# Patient Record
Sex: FEMALE | Race: WHITE | URBAN - METROPOLITAN AREA
[De-identification: names, ages, dates, MRNs, and addresses within clinical notes are randomized per-mention and may not be internally consistent; named-entity substitution may affect disease eponyms.]

---

## 2017-09-13 ENCOUNTER — PRE VISIT (OUTPATIENT)
Dept: ORTHOPEDICS | Facility: CLINIC | Age: 37
End: 2017-09-13

## 2017-09-13 NOTE — TELEPHONE ENCOUNTER
1.  Date/reason for appt: 10/3/17- Rt elbow ulnar entrapment, ring and pinkie finger is numb     2.  Referring provider: Self     3.  Call to patient (Yes / No - short description): No - pt is self referring. Per appt note - no outside records. (Kami Feliz PA-C is her friend.)     4.  Previous care at / records requested from:     [address] [place] [address] Orthopedics - Dr. Lovelace (Seen for knee pain.)     Closing encounter.

## 2017-10-03 ENCOUNTER — OFFICE VISIT (OUTPATIENT)
Dept: ORTHOPEDICS | Facility: CLINIC | Age: 37
End: 2017-10-03

## 2017-10-03 VITALS — WEIGHT: 150 LBS | BODY MASS INDEX: 21.47 KG/M2 | HEIGHT: 70 IN

## 2017-10-03 DIAGNOSIS — G56.21 ENTRAPMENT OF RIGHT ULNAR NERVE AT ELBOW: Primary | ICD-10-CM

## 2017-10-03 RX ORDER — BUPROPION HYDROCHLORIDE 150 MG/1
150 TABLET ORAL
COMMUNITY

## 2017-10-03 ASSESSMENT — ENCOUNTER SYMPTOMS
STIFFNESS: 0
SPEECH CHANGE: 0
NUMBNESS: 1
MUSCLE CRAMPS: 0
MYALGIAS: 0
WEAKNESS: 0
MUSCLE WEAKNESS: 0
HEADACHES: 0
DISTURBANCES IN COORDINATION: 0
BACK PAIN: 0
ARTHRALGIAS: 1
TREMORS: 0
SEIZURES: 0
PARALYSIS: 0
TINGLING: 1
NECK PAIN: 0
DIZZINESS: 0
MEMORY LOSS: 0
JOINT SWELLING: 0
LOSS OF CONSCIOUSNESS: 0

## 2017-10-03 NOTE — PROGRESS NOTES
CHIEF COMPLAINT:  Right tingling in the ring and small finger.      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  She states that she feels this initially started in a mild fashion during her pregnancy 5 or 6 years ago and it has been intermittently bothersome.  However, over the past 3 months she feels that the tingling has become worse.  She notices this most whenever she wakes up after sleeping on a hyperflexed elbow and has significant numbness in her ring and small finger.  This can last for hours or up to 2-3 days.  She does not have symptoms every night, but it is becoming more frequent.  She notes mild aching in her elbow.  Tingling is exacerbated by elbow flexion such as holding a cell phone to text or talk on the phone.  She has not had a trial of night bracing.  She is still able to perform her job without difficulty.      PAST MEDICAL HISTORY:  Asthma.  Patient had a pulmonary embolism as an adolescent following a right meniscal repair.      MEDICATIONS:   1.  Bupropion.   2.  Advair.   3.  Albuterol.   4.  Multivitamin.      ALLERGIES:  No known drug allergies.      PAST SURGICAL HISTORY:   1.  Appendectomy.   2.  Right meniscal repair.   3.  Hysterectomy for cervical intraepithelial neoplasia.      FAMILY HISTORY:  Negative for any bleeding or clotting disorders or problems with general anesthesia.      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  She does not smoke.  She drinks alcohol twice per week.      REVIEW OF SYSTEMS:  Please see intake form.      PHYSICAL EXAMINATION:    GENERAL: Well-developed, well-nourished female in no acute distress.   HEENT: Head is normocephalic and atraumatic.  Extraocular movements are intact.  NECK:  Full range of motion without pain  RESPIRATORY: Breathing is regular and non-labored on room air.  CIRCULATION:  Fingers warm and well perfused.   NEUROLOGIC:  Gross sensation intact to light touch in the median, radial and ulnar nerve distributions bilaterally.   On palpation with passive flexion and extension of the elbow, there is no ulnar nerve subluxation or perching.     FOCUSED EXAMINATION:  Focused exam of the bilateral upper extremities:  There is no deformity at the elbow and no apparent elbow valgus.  She has full flexion and extension of the elbow from 0-135 degrees bilaterally.  She has no tenderness to palpation over the medial epicondyle.  She has a positive Tinel's at the right cubital tunnel.  She has 5/5 strength with EPL, FPL and interosseous muscles of the hand with no evidence of thenar or hypothenar atrophy.  Negative Tinel's at the wrist.  Negative Froment's.  No Wartenberg's sign.  With holding her elbows in prolonged hyperflexion, she begins to experience paresthesias in the right ring and small fingers.  Two-point discrimination is 6 mm throughout the median and ulnar distributions bilaterally.    strength right hand 40/60/55.   Left hand 40/45/45 pounds.   QuickDASH is 9.09.      IMAGING:  Three views of the right elbow are reviewed and are negative for any acute osseous abnormality.      ASSESSMENT:  37-year-old otherwise healthy, right-hand dominant, emergency medicine physician with mild intermittent symptoms secondary to right cubital tunnel.      PLAN:   1.  We counseled Maranda that it is reasonable at this point to continue with nonoperative treatment and recommend an elbow pad or brace to prevent hyperflexion, particularly at night.  It is okay to proceed in this fashion as she has currently intermittent symptoms, no fixed numbness, no wasting/muscle atrophy or sensory abnormalities.   2.  We are also writing an order for hand therapy to teach her a home program for nerve  gliding exercises which could be done closer to home.      The patient will follow up on an as-needed basis should her symptoms worsen.  If she fails nonoperative treatment, she would be a candidate for in situ ulnar nerve decompression but was counseled on the possibility of the intraoperative decision to transpose the nerve should it show subluxation.      Patient seen and examined with Dr. Aurora Newman.     I have independently seen and evaluated the patient and agree with the findings and plan of care as documented by the resident and edited by me.          Answers for HPI/ROS submitted by the patient on 10/3/2017   General Symptoms: No  Skin Symptoms: No  HENT Symptoms: No  EYE SYMPTOMS: No  HEART SYMPTOMS: No  LUNG SYMPTOMS: No  INTESTINAL SYMPTOMS: No  URINARY SYMPTOMS: No  GYNECOLOGIC SYMPTOMS: No  BREAST SYMPTOMS: No  SKELETAL SYMPTOMS: Yes  BLOOD SYMPTOMS: No  NERVOUS SYSTEM SYMPTOMS: Yes  MENTAL HEALTH SYMPTOMS: No  Back pain: No  Muscle aches: No  Neck pain: No  Swollen joints: No  Joint pain: Yes  Bone pain: No  Muscle cramps: No  Muscle weakness: No  Joint stiffness: No  Bone fracture: No  Trouble with coordination: No  Dizziness or trouble with balance: No  Fainting or black-out spells: No  Memory loss: No  Headache: No  Seizures: No  Speech problems: No  Tingling: Yes  Tremor: No  Weakness: No  Difficulty walking: No  Paralysis: No  Numbness: Yes

## 2017-10-03 NOTE — NURSING NOTE
"Reason For Visit:   Chief Complaint   Patient presents with     Consult     RIght elbow pain, Numbness and tingling in 4th and 5th fingers       Primary MD: Jerrica Shen  Ref. MD: Dr. Feliz    Age: 37 year old    ?  No      Ht 1.778 m (5' 10\")  Wt 68 kg (150 lb)  BMI 21.52 kg/m2      Pain Assessment  Patient Currently in Pain: Yes  0-10 Pain Scale: 2  Primary Pain Location: Elbow  Pain Orientation: Right  Pain Descriptors: Aching, Dull, Tingling, Numbness    Hand Dominance Evaluation  Hand Dominance: Right      force  R hand  level 1 force: 18.1 kg (40 lb)  R hand  level 3 force: 27.2 kg (60 lb)  R hand  level 5 force: 24.9 kg (55 lb)  L hand   level 1 force: 18.1 kg (40 lb)  L hand  level 3 force: 20.4 kg (45 lb)  L hand  level 5 force: 20.4 kg (45 lb)    QuickDASH Assessment  QuickDASH Main 10/3/2017   1.Open a tight or new jar. No difficulty   2. Do heavy household chores (e.g., wash walls, floors) No difficulty   3. Carry a shopping bag or briefcase. No difficulty   4. Wash your back. No difficulty   5. Use a knife to cut food. No difficulty   6. Recreational activities in which you take some force or impact through your arm, shoulder or hand (e.g., golf, hammering, tennis, etc.). No difficulty   7. During the past week, to what extent has your arm, shoulder or hand problem interfered with your normal social activities with family, friends, neighbours or groups? Not at all   8. During the past week, were you limited in your work or other regular daily activities as a result of your arm, shoulder or hand problem? Not limited at all   9. Arm, shoulder or hand pain. Mild   10.Tingling (pins and needles) in your arm,shoulder or hand. Moderate   11. During the past week, how much difficulty have you had sleeping because of the pain in your arm, shoulder or hand? ([place] number) Mild difficulty   Quickdash Ability Score 9.09          Current Outpatient Prescriptions "   Medication Sig Dispense Refill     buPROPion (WELLBUTRIN XL) 150 MG 24 hr tablet Take 150 mg by mouth       fluticasone-salmeterol (ADVAIR DISKUS) 250-50 MCG/DOSE diskus inhaler Inhale 2 puffs into the lungs daily.       albuterol (2.5 MG/3ML) 0.083% nebulizer solution Take 1 ampule by nebulization as needed.       Multiple Vitamin (MULTIVITAMINS PO) Take 1 tablet by mouth daily.         No Known Allergies    Jany Krishna LPN

## 2017-10-03 NOTE — MR AVS SNAPSHOT
After Visit Summary   10/3/2017    Maranda Nelson    MRN: [id number]           Patient Information     Date Of Birth          1980        Visit Information        Provider Department      10/3/2017 2:15 PM Aurora Newman MD [facility]        Today's Diagnoses     Entrapment of right ulnar nerve at elbow    -  1       Follow-ups after your visit        Additional Services     OCCUPATIONAL THERAPY REFERRAL       Hand Therapy Referral                  Who to contact     Please call your clinic at [phone] to:    Ask questions about your health    Make or cancel appointments    Discuss your medicines    Learn about your test results    Speak to your doctor   If you have compliments or concerns about an experience at your clinic, or if you wish to file a complaint, please contact [facility] Patient Relations at [phone] or email us at [email or web address]         Additional Information About Your Visit        MyChart Information     [employer] is an electronic gateway that provides easy, online access to your medical records. With [employer], you can request a clinic appointment, read your test results, renew a prescription or communicate with your care team.     To sign up for [employer] visit the website at [email or web address]   You will be asked to enter the access code listed below, as well as some personal information. Please follow the directions to create your username and password.     Your access code is: [email or web address]  Expires: 2017  6:31 AM     Your access code will  in 90 days. If you need help or a new code, please contact your [facility] or call [phone] for assistance.        Care EveryWhere ID     This is your Care EveryWhere ID. This could be used by other organizations to access your [place] medical records  [id number]        Your Vitals Were     Height BMI (Body  "Mass Index)                1.778 m (5' 10\") 21.52 kg/m2           Blood Pressure from Last 3 Encounters:   09/26/12 108/61   12/14/11 105/70   09/30/11 97/62    Weight from Last 3 Encounters:   10/03/17 68 kg (150 lb)   08/26/15 65.8 kg (145 lb)   09/26/12 70.5 kg (155 lb 6.4 oz)              We Performed the Following     OCCUPATIONAL THERAPY REFERRAL          Today's Medication Changes          These changes are accurate as of: 10/3/17 11:59 PM.  If you have any questions, ask your nurse or doctor.               Stop taking these medicines if you haven't already. Please contact your care team if you have questions.     sertraline 50 MG tablet   Commonly known as:  ZOLOFT   Stopped by:  Aurora Newman MD                    Primary Care Provider Office Phone # Fax #    Jerrica Shen [phone]       [facility] [address]  [place] [address]        Equal Access to Services     [facility] AH: Hadii aad ku hadasho Soomaali, waaxda luqadaha, qaybta kaalmada adeegyada, waxay idiin hayainsleyn radha gates . So [facility] [phone].    ATENCIÓN: Si habla español, tiene a joseph disposición servicios gratuitos de asistencia lingüística. LlK[facility]tering [facility] [phone].    We comply with applicable federal civil rights laws and [place] laws. We do not discriminate on the basis of race, color, national origin, age, disability, sex, sexual orientation, or gender identity.            Thank you!     Thank you for choosing [facility]  for your care. Our goal is always to provide you with excellent care. Hearing back from our patients is one way we can continue to improve our services. Please take a few minutes to complete the written survey that you may receive in the mail after your visit with us. Thank you!             Your Updated Medication List - Protect others around you: Learn how to safely use, store and throw away your medicines at www.disposemymeds.org.          This " list is accurate as of: 10/3/17 11:59 PM.  Always use your most recent med list.                   Brand Name Dispense Instructions for use Diagnosis    ADVAIR DISKUS 250-50 MCG/DOSE diskus inhaler   Generic drug:  fluticasone-salmeterol      Inhale 2 puffs into the lungs daily.        albuterol (2.5 MG/3ML) 0.083% neb solution      Take 1 ampule by nebulization as needed.        buPROPion 150 MG 24 hr tablet    WELLBUTRIN XL     Take 150 mg by mouth        MULTIVITAMINS PO      Take 1 tablet by mouth daily.

## 2017-11-07 ENCOUNTER — THERAPY VISIT (OUTPATIENT)
Dept: OCCUPATIONAL THERAPY | Facility: CLINIC | Age: 37
End: 2017-11-07
Payer: COMMERCIAL

## 2017-11-07 DIAGNOSIS — G56.21 ULNAR NERVE ENTRAPMENT AT ELBOW, RIGHT: Primary | ICD-10-CM

## 2017-11-07 PROCEDURE — 97112 NEUROMUSCULAR REEDUCATION: CPT | Mod: GO | Performed by: OCCUPATIONAL THERAPIST

## 2017-11-07 PROCEDURE — 97165 OT EVAL LOW COMPLEX 30 MIN: CPT | Mod: GO | Performed by: OCCUPATIONAL THERAPIST

## 2017-11-07 NOTE — PROGRESS NOTES
Hand Therapy Initial Evaluation  Current Date:  11/7/2017    Referring MD: Dr. Newman  Return to MD: 6 months, if not improving    Diagnosis:  R Ulnar Nerve Entrapment at Elbow  DOI: 10/3/17 - MD order  Post:  > 1 year since onset of symptoms    Subjective:  Maranda Nelson is a 37 year old right hand dominant female.    Patient reports symptoms of pain, numbness and tingling  of the right elbow down to RF and SF which occurred due to above diagnosis. Patient reports onset of symptoms during pregnancy, but worsened over the past couple of years. Since onset symptoms are Unchanged.  Special tests:  none.  Previous treatment: none.    General health as reported by patient is excellent.  Pertinent medical history includes:none  Medical allergies:none.  Surgical history: orthopedic: R Knee.  Medication history: none.    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  Job Tasks: prolonged sitting, computer work  Prior functional level:  no limitations  Barriers include:none  Mobility: No difficulty  Transportation: drives  Leisure activities/hobbies: [employer] classes, running  Other: 2 children - 5 and 6 years, has a dog and a cat    Upper Extremity Functional Index Score:  SCORE:   Column Totals: /80: 78   (A lower score indicates greater disability.)    Objective:    Pain Report:  VAS(0-10) 11/7/17   At Rest: 0/10   With Use: 1-2/10   Location:  R Medial elbow  Description:  Numbness, aching tenderness  Frequency:  intermittent   Pain is worse:  At night, During the day  Pain is exacerbated by:  Sleeping position, elbow flexion, triceps exercises  Pain is relieved by:  Elbow extension   Progression since onset:  unchanged    ROM: Elbow AROM (PROM)  11/7/17 Date: 11/7/17   Left Side: Right   0 Extension 0   148 Flexion 148   85 Supination 85   85 Pronation 85      and Pinch Strength (pounds)  11/7 Date: 11/7   Left    Side   Right   51        # 1                # 2                # 3         Average 65   13  3 Point  # 1               # 2               # 3        Average 15   15  Lateral  # 1                # 2                # 3         Average 16     Edema:  [x]        None  []         Mild   []        Moderate  []         Severe     []         of affected part    Scar/Wound:  N/A    Sensation: []         WNL throughout all nerve distributions; per patient report    [x]         Decreased  Ulnarnerve distribution    Special Tests:   Date 11/7/17   Side Right   Tinels CT NT   Tinels PIN NT   Tinels DSRN NT   Tinels cubital tunnel + mild   Tinels [place] -   Flexed Elbows 20 sec     Assessment:  Patient presents with symptoms consistent with diagnosis as listed above with conservative intervention.     Patient's limitations or Problem List includes:  Pain and Sensory disturbance of the right elbow, ring finger and small finger which interferes with the patient's ability to perform Sleep Patterns and Recreational Activities as compared to previous level of function.    Rehab Potential:  Excellent - Return to full activity, no limitations    Patient will benefit from skilled Occupational Therapy to decrease sensory disturbance to return to previous activity level and resume normal daily tasks and to reach their rehab potential.    Barriers to Learning:  No barrier    Communication Issues:  Patient appears to be able to clearly communicate and understand verbal and written communication and follow directions correctly.    Chart Review: Chart Review and Detailed history review with patient    Identified Performance Deficits: sleep and leisure activities    Assessment of Occupational Performance:  1-3 Performance Deficits    Clinical Decision Making (Complexity): Low complexity    Treatment Explanation:  The following has been discussed with the patient:  RX ordered/plan of care  Anticipated outcomes  Possible risks and side  effects    Plan:  Frequency:  1 X a month, once daily  Duration:  for 2 months    Treatment Plan:  Therapeutic Exercise:  PROM  Neuromuscular re-education:  Nerve Gliding  Orthotic Fabrication:  Volar elbow flexion blocking splint prn    Home Program:  Avoid prolonged elbow flexion (talking on phone with R, leaning on bent elbows)  Elbow flexion blocking splint (patient purchased on [employer], has not tried yet)  FA flexor stretches  Proximal UN glides    Next visit:   Check response to splint purchased from [employer], fabricate custom PRN  MFR  Stretches  UN glides  K-tape trial?  DC to HEP if doing well    Discharge Plan:  Achieve all LTG.  Independent in home treatment program.  Reach maximal therapeutic benefit.    Please see daily flow sheet for treatment and 1:1 time provided today.

## 2017-11-07 NOTE — LETTER
[facility]  [address]  [facility] [address]  [phone]    2017    Re: Maranda Nelson   :   1980  MRN:  [id number]   REFERRING PHYSICIAN:   Aurora Newman    [facility]    Date of Initial Evaluation:  17  Visits:  Rxs Used: 1  Reason for Referral:  Ulnar nerve entrapment at elbow, right    Hand Therapy Initial Evaluation    Referring MD: Dr. Newman  Return to MD: 6 months, if not improving  Diagnosis:  R Ulnar Nerve Entrapment at Elbow  DOI: 10/3/17 - MD order  Post:  > 1 year since onset of symptoms  Subjective:  Maranda Nelson is a 37 year old right hand dominant female.  Patient reports symptoms of pain, numbness and tingling  of the right elbow down to RF and SF which occurred due to above diagnosis. Patient reports onset of symptoms during pregnancy, but worsened over the past couple of years. Since onset symptoms are Unchanged.  Special tests:  none.  Previous treatment: none.    General health as reported by patient is excellent.  Pertinent medical history includes:none  Medical allergies:none.  Surgical history: orthopedic: R Knee.  Medication history: none.  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  Job Tasks: prolonged sitting, computer work  Prior functional level:  no limitations  Barriers include:none  Mobility: No difficulty  Transportation: drives  Leisure activities/hobbies: [employer] classes, running  Other: 2 children - 5 and 6 years, has a dog and a cat  Upper Extremity Functional Index Score:  SCORE:   Column Totals: /80: 78   (A lower score indicates greater disability.)                Re: Maranda Nelson   :   1980    Objective:  Pain Report:  VAS(0-10) 17   At Rest: 0/10   With Use: 1-2/10   Location:  R Medial elbow  Description:  Numbness, aching tenderness  Frequency:   intermittent   Pain is worse:  At night, During the day  Pain is exacerbated by:  Sleeping position, elbow flexion, triceps exercises  Pain is relieved by:  Elbow extension   Progression since onset:  unchanged    ROM: Elbow AROM (PROM)  17 Date: 17   Left Side: Right   0 Extension 0   148 Flexion 148   85 Supination 85   85 Pronation 85      and Pinch Strength (pounds)   Date:    Left    Side  Right   51        # 1                # 2                # 3         Average 65   13  3 Point  # 1               # 2               # 3        Average 15   15  Lateral  # 1                # 2                # 3         Average 16     Edema:  [x]        None  []         Mild   []        Moderate  []         Severe     []         of affected part    Scar/Wound:  N/A    Sensation: []         WNL throughout all nerve distributions; per patient report    [x]         Decreased  Ulnarnerve distribution      Re: Maranda Nelson   :   1980    Special Tests:   Date 17   Side Right   Tinels CT NT   Tinels PIN NT   Tinels DSRN NT   Tinels cubital tunnel + mild   Tinels [place] -   Flexed Elbows 20 sec     Assessment:  Patient presents with symptoms consistent with diagnosis as listed above with conservative intervention.   Patient's limitations or Problem List includes:  Pain and Sensory disturbance of the right elbow, ring finger and small finger which interferes with the patient's ability to perform Sleep Patterns and Recreational Activities as compared to previous level of function.  Rehab Potential:  Excellent - Return to full activity, no limitations  Patient will benefit from skilled Occupational Therapy to decrease sensory disturbance to return to previous activity level and resume normal daily tasks and to reach their rehab potential.  Barriers to Learning:  No barrier  Communication Issues:  Patient appears to be able to clearly communicate and understand verbal and written  communication and follow directions correctly.  Chart Review: Chart Review and Detailed history review with patient  Identified Performance Deficits: sleep and leisure activities    Assessment of Occupational Performance:  1-3 Performance Deficits  Clinical Decision Making (Complexity): Low complexity  Treatment Explanation:  The following has been discussed with the patient:  RX ordered/plan of care  Anticipated outcomes  Possible risks and side effects  Plan:  Frequency:  1 X a month, once daily  Duration:  for 2 months  Treatment Plan:  Therapeutic Exercise:  PROM  Neuromuscular re-education:  Nerve Gliding  Orthotic Fabrication:  Volar elbow flexion blocking splint prn  Home Program:  Avoid prolonged elbow flexion (talking on phone with R, leaning on bent elbows)  Elbow flexion blocking splint (patient purchased on [employer], has not tried yet)  FA flexor stretches  Proximal UN glides  Next visit:               Re: Maranda Nelson   :   1980    Check response to splint purchased from [employer], fabricate custom PRN  MFR  Stretches  UN glides  K-tape trial?  DC to HEP if doing well  Discharge Plan:  Achieve all LTG.  Independent in home treatment program.  Reach maximal therapeutic benefit.    Thank you for your referral.    INQUIRIES  Therapist: CAITY Lopze/L, CHT   [address]  [facility] [address]  Phone: [phone]  Fax: [phone]